# Patient Record
Sex: FEMALE | URBAN - METROPOLITAN AREA
[De-identification: names, ages, dates, MRNs, and addresses within clinical notes are randomized per-mention and may not be internally consistent; named-entity substitution may affect disease eponyms.]

---

## 2021-06-03 ENCOUNTER — APPOINTMENT (RX ONLY)
Dept: URBAN - METROPOLITAN AREA CLINIC 87 | Facility: CLINIC | Age: 21
Setting detail: DERMATOLOGY
End: 2021-06-03

## 2021-06-03 DIAGNOSIS — L92.3 FOREIGN BODY GRANULOMA OF THE SKIN AND SUBCUTANEOUS TISSUE: ICD-10-CM

## 2021-06-03 DIAGNOSIS — L91.0 HYPERTROPHIC SCAR: ICD-10-CM

## 2021-06-03 PROCEDURE — ? FOREIGN BODY REMOVAL

## 2021-06-03 PROCEDURE — ? PRESCRIPTION

## 2021-06-03 PROCEDURE — ? TREATMENT REGIMEN

## 2021-06-03 PROCEDURE — 10120 INC&RMVL FB SUBQ TISS SMPL: CPT

## 2021-06-03 PROCEDURE — 99203 OFFICE O/P NEW LOW 30 MIN: CPT | Mod: 25

## 2021-06-03 PROCEDURE — ? COUNSELING

## 2021-06-03 RX ORDER — MUPIROCIN 20 MG/G
OINTMENT TOPICAL
Qty: 1 | Refills: 0 | Status: ERX | COMMUNITY
Start: 2021-06-03

## 2021-06-03 RX ADMIN — MUPIROCIN: 20 OINTMENT TOPICAL at 00:00

## 2021-06-03 ASSESSMENT — LOCATION ZONE DERM: LOCATION ZONE: EAR

## 2021-06-03 ASSESSMENT — LOCATION DETAILED DESCRIPTION DERM: LOCATION DETAILED: LEFT SUPERIOR POSTERIOR HELIX

## 2021-06-03 ASSESSMENT — LOCATION SIMPLE DESCRIPTION DERM: LOCATION SIMPLE: LEFT EAR

## 2021-06-03 NOTE — PROCEDURE: FOREIGN BODY REMOVAL
Bill For Simple Or Complicated Fb Removal?: simple
Detail Level: Detailed
Hemostasis: Drysol
Anesthesia Volume In Cc: 0.5

## 2021-06-03 NOTE — PROCEDURE: TREATMENT REGIMEN
Detail Level: Zone
Plan: LN2 followed by ILK 40 - 0.1 cc delivered by Dr. LEVINE\\nLOT:[id number]\\nEXP:4/2022\\n\\n\\nAfter a series of injections, we can discuss surgical removal.

## 2021-07-14 ENCOUNTER — APPOINTMENT (RX ONLY)
Dept: URBAN - METROPOLITAN AREA CLINIC 87 | Facility: CLINIC | Age: 21
Setting detail: DERMATOLOGY
End: 2021-07-14

## 2021-07-14 VITALS — TEMPERATURE: 97.4 F

## 2021-07-14 DIAGNOSIS — L91.0 HYPERTROPHIC SCAR: ICD-10-CM

## 2021-07-14 PROCEDURE — ? COUNSELING

## 2021-07-14 PROCEDURE — ? TREATMENT REGIMEN

## 2021-07-14 PROCEDURE — 99213 OFFICE O/P EST LOW 20 MIN: CPT

## 2021-07-14 ASSESSMENT — LOCATION ZONE DERM: LOCATION ZONE: EAR

## 2021-07-14 ASSESSMENT — LOCATION SIMPLE DESCRIPTION DERM: LOCATION SIMPLE: LEFT EAR

## 2021-07-14 ASSESSMENT — LOCATION DETAILED DESCRIPTION DERM: LOCATION DETAILED: LEFT SUPERIOR POSTERIOR HELIX

## 2021-07-14 NOTE — PROCEDURE: TREATMENT REGIMEN
Detail Level: Zone
Plan: Tx 2 \\nLN2 followed with ILK \\nILK 40 \\n0.2 cc L ear \\nLOT: [id number]\\nEXP: MAY 2022\\nBy Dr. MUE

## 2021-08-17 ENCOUNTER — APPOINTMENT (RX ONLY)
Dept: URBAN - METROPOLITAN AREA CLINIC 87 | Facility: CLINIC | Age: 21
Setting detail: DERMATOLOGY
End: 2021-08-17

## 2021-08-17 VITALS — TEMPERATURE: 97.5 F

## 2021-08-17 DIAGNOSIS — L91.0 HYPERTROPHIC SCAR: ICD-10-CM

## 2021-08-17 PROCEDURE — 99213 OFFICE O/P EST LOW 20 MIN: CPT

## 2021-08-17 PROCEDURE — ? COUNSELING

## 2021-08-17 PROCEDURE — ? TREATMENT REGIMEN

## 2021-08-17 ASSESSMENT — LOCATION ZONE DERM: LOCATION ZONE: EAR

## 2021-08-17 ASSESSMENT — LOCATION DETAILED DESCRIPTION DERM: LOCATION DETAILED: LEFT SUPERIOR POSTERIOR HELIX

## 2021-08-17 ASSESSMENT — LOCATION SIMPLE DESCRIPTION DERM: LOCATION SIMPLE: LEFT EAR

## 2021-08-17 NOTE — PROCEDURE: TREATMENT REGIMEN
Detail Level: Zone
Plan: Tx 3\\nILK 40 \\n0.1 cc L ear \\nLOT: [id number]\\nEXP: FEB 2022\\nBy Dr. HIGUERA\\n\\nBriefly discussed excision of keloid following with ILK injections every other wk. Briefly discussed risk and benefit of having keloid removed. Topical imiquimod will be prescribed for at home treatment. \\n\\nPt is moving to [place] for work in November will have excision done before November